# Patient Record
Sex: MALE | Race: WHITE | NOT HISPANIC OR LATINO | Employment: FULL TIME | URBAN - METROPOLITAN AREA
[De-identification: names, ages, dates, MRNs, and addresses within clinical notes are randomized per-mention and may not be internally consistent; named-entity substitution may affect disease eponyms.]

---

## 2024-06-30 ENCOUNTER — HOSPITAL ENCOUNTER (OUTPATIENT)
Facility: HOSPITAL | Age: 44
Setting detail: OBSERVATION
Discharge: HOME | End: 2024-07-01
Attending: EMERGENCY MEDICINE | Admitting: EMERGENCY MEDICINE
Payer: COMMERCIAL

## 2024-06-30 ENCOUNTER — APPOINTMENT (OUTPATIENT)
Dept: GENERAL RADIOLOGY | Facility: HOSPITAL | Age: 44
End: 2024-06-30
Payer: COMMERCIAL

## 2024-06-30 DIAGNOSIS — R07.2 SUBSTERNAL CHEST PAIN: ICD-10-CM

## 2024-06-30 DIAGNOSIS — T58.91XA TOXIC EFFECT OF CARBON MONOXIDE, UNINTENTIONAL, INITIAL ENCOUNTER: Primary | ICD-10-CM

## 2024-06-30 LAB
COHGB MFR BLD: 24.3 % (ref 0–3)
HOLD SPECIMEN: NORMAL
HOLD SPECIMEN: NORMAL
WHOLE BLOOD HOLD COAG: NORMAL
WHOLE BLOOD HOLD SPECIMEN: NORMAL

## 2024-06-30 PROCEDURE — 96374 THER/PROPH/DIAG INJ IV PUSH: CPT

## 2024-06-30 PROCEDURE — 82375 ASSAY CARBOXYHB QUANT: CPT

## 2024-06-30 PROCEDURE — 25810000003 SODIUM CHLORIDE 0.9 % SOLUTION

## 2024-06-30 PROCEDURE — 85025 COMPLETE CBC W/AUTO DIFF WBC: CPT

## 2024-06-30 PROCEDURE — 93005 ELECTROCARDIOGRAM TRACING: CPT | Performed by: EMERGENCY MEDICINE

## 2024-06-30 PROCEDURE — 99285 EMERGENCY DEPT VISIT HI MDM: CPT

## 2024-06-30 PROCEDURE — 71046 X-RAY EXAM CHEST 2 VIEWS: CPT

## 2024-06-30 PROCEDURE — 93005 ELECTROCARDIOGRAM TRACING: CPT

## 2024-06-30 PROCEDURE — 85007 BL SMEAR W/DIFF WBC COUNT: CPT

## 2024-06-30 RX ORDER — FAMOTIDINE 10 MG/ML
20 INJECTION, SOLUTION INTRAVENOUS ONCE
Status: COMPLETED | OUTPATIENT
Start: 2024-06-30 | End: 2024-06-30

## 2024-06-30 RX ORDER — SODIUM CHLORIDE 0.9 % (FLUSH) 0.9 %
10 SYRINGE (ML) INJECTION AS NEEDED
Status: DISCONTINUED | OUTPATIENT
Start: 2024-06-30 | End: 2024-07-01 | Stop reason: HOSPADM

## 2024-06-30 RX ADMIN — SODIUM CHLORIDE 500 ML: 0.9 INJECTION, SOLUTION INTRAVENOUS at 23:45

## 2024-06-30 RX ADMIN — FAMOTIDINE 20 MG: 10 INJECTION INTRAVENOUS at 23:45

## 2024-07-01 VITALS
HEIGHT: 74 IN | BODY MASS INDEX: 37.83 KG/M2 | SYSTOLIC BLOOD PRESSURE: 132 MMHG | TEMPERATURE: 97.3 F | HEART RATE: 52 BPM | WEIGHT: 294.75 LBS | DIASTOLIC BLOOD PRESSURE: 83 MMHG | OXYGEN SATURATION: 100 % | RESPIRATION RATE: 16 BRPM

## 2024-07-01 PROBLEM — T58.91XA CARBON MONOXIDE POISONING: Status: ACTIVE | Noted: 2024-07-01

## 2024-07-01 LAB
ALBUMIN SERPL-MCNC: 4.2 G/DL (ref 3.5–5.2)
ALBUMIN/GLOB SERPL: 2 G/DL
ALP SERPL-CCNC: 70 U/L (ref 39–117)
ALT SERPL W P-5'-P-CCNC: 24 U/L (ref 1–41)
ANION GAP SERPL CALCULATED.3IONS-SCNC: 8.7 MMOL/L (ref 5–15)
AST SERPL-CCNC: 24 U/L (ref 1–40)
BASOPHILS # BLD AUTO: 0.04 10*3/MM3 (ref 0–0.2)
BASOPHILS # BLD AUTO: 0.06 10*3/MM3 (ref 0–0.2)
BASOPHILS NFR BLD AUTO: 0.4 % (ref 0–1.5)
BASOPHILS NFR BLD AUTO: 0.5 % (ref 0–1.5)
BILIRUB SERPL-MCNC: 0.2 MG/DL (ref 0–1.2)
BUN SERPL-MCNC: 21 MG/DL (ref 6–20)
BUN/CREAT SERPL: 21.9 (ref 7–25)
CALCIUM SPEC-SCNC: 8.8 MG/DL (ref 8.6–10.5)
CHLORIDE SERPL-SCNC: 105 MMOL/L (ref 98–107)
CO2 SERPL-SCNC: 25.3 MMOL/L (ref 22–29)
COHGB MFR BLD: 4.6 % (ref 0–3)
CREAT SERPL-MCNC: 0.96 MG/DL (ref 0.76–1.27)
DEPRECATED RDW RBC AUTO: 41.8 FL (ref 37–54)
DEPRECATED RDW RBC AUTO: 42.4 FL (ref 37–54)
EGFRCR SERPLBLD CKD-EPI 2021: 100 ML/MIN/1.73
EOSINOPHIL # BLD AUTO: 0.26 10*3/MM3 (ref 0–0.4)
EOSINOPHIL # BLD AUTO: 0.3 10*3/MM3 (ref 0–0.4)
EOSINOPHIL NFR BLD AUTO: 2.7 % (ref 0.3–6.2)
EOSINOPHIL NFR BLD AUTO: 2.7 % (ref 0.3–6.2)
ERYTHROCYTE [DISTWIDTH] IN BLOOD BY AUTOMATED COUNT: 13 % (ref 12.3–15.4)
ERYTHROCYTE [DISTWIDTH] IN BLOOD BY AUTOMATED COUNT: 13 % (ref 12.3–15.4)
GLOBULIN UR ELPH-MCNC: 2.1 GM/DL
GLUCOSE SERPL-MCNC: 94 MG/DL (ref 65–99)
HCT VFR BLD AUTO: 38.8 % (ref 37.5–51)
HCT VFR BLD AUTO: 44.9 % (ref 37.5–51)
HGB BLD-MCNC: 12.9 G/DL (ref 13–17.7)
HGB BLD-MCNC: 15.2 G/DL (ref 13–17.7)
IMM GRANULOCYTES # BLD AUTO: 0.04 10*3/MM3 (ref 0–0.05)
IMM GRANULOCYTES # BLD AUTO: 0.04 10*3/MM3 (ref 0–0.05)
IMM GRANULOCYTES NFR BLD AUTO: 0.4 % (ref 0–0.5)
IMM GRANULOCYTES NFR BLD AUTO: 0.4 % (ref 0–0.5)
LARGE PLATELETS: NORMAL
LYMPHOCYTES # BLD AUTO: 2.19 10*3/MM3 (ref 0.7–3.1)
LYMPHOCYTES # BLD AUTO: 2.42 10*3/MM3 (ref 0.7–3.1)
LYMPHOCYTES NFR BLD AUTO: 21.4 % (ref 19.6–45.3)
LYMPHOCYTES NFR BLD AUTO: 22.8 % (ref 19.6–45.3)
MCH RBC QN AUTO: 29.3 PG (ref 26.6–33)
MCH RBC QN AUTO: 29.8 PG (ref 26.6–33)
MCHC RBC AUTO-ENTMCNC: 33.2 G/DL (ref 31.5–35.7)
MCHC RBC AUTO-ENTMCNC: 33.9 G/DL (ref 31.5–35.7)
MCV RBC AUTO: 88 FL (ref 79–97)
MCV RBC AUTO: 88.2 FL (ref 79–97)
MONOCYTES # BLD AUTO: 0.42 10*3/MM3 (ref 0.1–0.9)
MONOCYTES # BLD AUTO: 0.7 10*3/MM3 (ref 0.1–0.9)
MONOCYTES NFR BLD AUTO: 4.4 % (ref 5–12)
MONOCYTES NFR BLD AUTO: 6.2 % (ref 5–12)
NEUTROPHILS NFR BLD AUTO: 6.67 10*3/MM3 (ref 1.7–7)
NEUTROPHILS NFR BLD AUTO: 68.8 % (ref 42.7–76)
NEUTROPHILS NFR BLD AUTO: 69.3 % (ref 42.7–76)
NEUTROPHILS NFR BLD AUTO: 7.8 10*3/MM3 (ref 1.7–7)
NRBC BLD AUTO-RTO: 0 /100 WBC (ref 0–0.2)
NRBC BLD AUTO-RTO: 0 /100 WBC (ref 0–0.2)
PLATELET # BLD AUTO: 240 10*3/MM3 (ref 140–450)
PLATELET # BLD AUTO: 274 10*3/MM3 (ref 140–450)
PMV BLD AUTO: 11.3 FL (ref 6–12)
PMV BLD AUTO: 11.7 FL (ref 6–12)
POTASSIUM SERPL-SCNC: 3.9 MMOL/L (ref 3.5–5.2)
PROT SERPL-MCNC: 6.3 G/DL (ref 6–8.5)
QT INTERVAL: 333 MS
QTC INTERVAL: 451 MS
RBC # BLD AUTO: 4.4 10*6/MM3 (ref 4.14–5.8)
RBC # BLD AUTO: 5.1 10*6/MM3 (ref 4.14–5.8)
RBC MORPH BLD: NORMAL
SMALL PLATELETS BLD QL SMEAR: ADEQUATE
SODIUM SERPL-SCNC: 139 MMOL/L (ref 136–145)
TROPONIN T SERPL HS-MCNC: 12 NG/L
TROPONIN T SERPL HS-MCNC: 17 NG/L
WBC MORPH BLD: NORMAL
WBC NRBC COR # BLD AUTO: 11.32 10*3/MM3 (ref 3.4–10.8)
WBC NRBC COR # BLD AUTO: 9.62 10*3/MM3 (ref 3.4–10.8)

## 2024-07-01 PROCEDURE — G0378 HOSPITAL OBSERVATION PER HR: HCPCS

## 2024-07-01 PROCEDURE — 80053 COMPREHEN METABOLIC PANEL: CPT

## 2024-07-01 PROCEDURE — 85025 COMPLETE CBC W/AUTO DIFF WBC: CPT

## 2024-07-01 PROCEDURE — 84484 ASSAY OF TROPONIN QUANT: CPT

## 2024-07-01 PROCEDURE — 82375 ASSAY CARBOXYHB QUANT: CPT

## 2024-07-01 PROCEDURE — 84484 ASSAY OF TROPONIN QUANT: CPT | Performed by: EMERGENCY MEDICINE

## 2024-07-01 RX ORDER — ONDANSETRON 2 MG/ML
4 INJECTION INTRAMUSCULAR; INTRAVENOUS EVERY 6 HOURS PRN
Status: DISCONTINUED | OUTPATIENT
Start: 2024-07-01 | End: 2024-07-01 | Stop reason: HOSPADM

## 2024-07-01 RX ORDER — ALBUTEROL SULFATE 90 UG/1
2 AEROSOL, METERED RESPIRATORY (INHALATION) EVERY 4 HOURS PRN
Qty: 8 G | Refills: 0 | Status: SHIPPED | OUTPATIENT
Start: 2024-07-01

## 2024-07-01 RX ORDER — SODIUM CHLORIDE 0.9 % (FLUSH) 0.9 %
10 SYRINGE (ML) INJECTION AS NEEDED
Status: DISCONTINUED | OUTPATIENT
Start: 2024-07-01 | End: 2024-07-01 | Stop reason: HOSPADM

## 2024-07-01 RX ORDER — SODIUM CHLORIDE 0.9 % (FLUSH) 0.9 %
10 SYRINGE (ML) INJECTION EVERY 12 HOURS SCHEDULED
Status: DISCONTINUED | OUTPATIENT
Start: 2024-07-01 | End: 2024-07-01 | Stop reason: HOSPADM

## 2024-07-01 RX ORDER — SODIUM CHLORIDE 9 MG/ML
40 INJECTION, SOLUTION INTRAVENOUS AS NEEDED
Status: DISCONTINUED | OUTPATIENT
Start: 2024-07-01 | End: 2024-07-01 | Stop reason: HOSPADM

## 2024-07-01 RX ORDER — UREA 10 %
5 LOTION (ML) TOPICAL NIGHTLY PRN
Status: DISCONTINUED | OUTPATIENT
Start: 2024-07-01 | End: 2024-07-01 | Stop reason: HOSPADM

## 2024-07-01 RX ADMIN — Medication 10 ML: at 08:27

## 2024-07-01 RX ADMIN — Medication 10 ML: at 01:42

## 2024-07-01 NOTE — ED PROVIDER NOTES
Subjective   History of Present Illness  Chief Complaint: Dizziness, elevated blood pressure      HPI: Patient is a 44-year-old male who presents by private vehicle he states this evening he was working in the garage with the doors closed, car running shortly following this he became dizzy flushed with mid chest pain radiating into his back.  He describes it as a burning.  He states he is post take blood pressure medicine but has been monitoring his blood pressure with lifestyle changes and he typically runs in the 120s. Continues mild midsternal burning, other symptoms have resolved.  No thoughts of harming himself. Took 6-7 baby aspirins just before arrival.     PCP: none on file     History provided by:  Patient      Review of Systems  See above as noted     No past medical history on file.    No Known Allergies    No past surgical history on file.    No family history on file.    Social History     Socioeconomic History    Marital status:            Objective   Physical Exam  Vitals reviewed.   Constitutional:       Appearance: He is obese. He is not toxic-appearing.   HENT:      Head: Normocephalic.   Eyes:      Pupils: Pupils are equal, round, and reactive to light.   Cardiovascular:      Rate and Rhythm: Normal rate.      Pulses: Normal pulses.      Heart sounds: Normal heart sounds.   Pulmonary:      Effort: Pulmonary effort is normal.      Breath sounds: No stridor. No wheezing.   Skin:     Coloration: Skin is not pale.   Neurological:      General: No focal deficit present.      Mental Status: He is alert and oriented to person, place, and time. Mental status is at baseline.      Motor: No weakness.         Procedures                 ED Course  ED Course as of 07/01/24 0126   Sun Jun 30, 2024   2343 Carbon Monoxide, Blood(!!): 24.3 [BH]      ED Course User Index  [BH] Roxy Lantigua APRN      /85 (BP Location: Right arm, Patient Position: Lying)   Pulse 73   Temp 97.3 °F (36.3 °C)  "(Oral)   Resp 16   Ht 188 cm (74\")   Wt 134 kg (294 lb 12.1 oz)   SpO2 100%   BMI 37.84 kg/m²   Labs Reviewed   COMPREHENSIVE METABOLIC PANEL - Abnormal; Notable for the following components:       Result Value    BUN 21 (*)     All other components within normal limits    Narrative:     GFR Normal >60  Chronic Kidney Disease <60  Kidney Failure <15     CBC WITH AUTO DIFFERENTIAL - Abnormal; Notable for the following components:    WBC 11.32 (*)     Neutrophils, Absolute 7.80 (*)     All other components within normal limits   CARBON MONOXIDE, BLOOD - Abnormal; Notable for the following components:    Carbon Monoxide, Blood 24.3 (*)     All other components within normal limits   TROPONIN - Normal    Narrative:     High Sensitive Troponin T Reference Range:  <14.0 ng/L- Negative Female for AMI  <22.0 ng/L- Negative Male for AMI  >=14 - Abnormal Female indicating possible myocardial injury.  >=22 - Abnormal Male indicating possible myocardial injury.   Clinicians would have to utilize clinical acumen, EKG, Troponin, and serial changes to determine if it is an Acute Myocardial Infarction or myocardial injury due to an underlying chronic condition.        RAINBOW DRAW    Narrative:     The following orders were created for panel order [place] Draw.  Procedure                               Abnormality         Status                     ---------                               -----------         ------                     Green Top (Gel)[[id number]]                                  Final result               Lavender Top[[id number]]                                     Final result               Gold Top - SST[[id number]]                                   Final result               Light Blue Top[[id number]]                                   Final result                 Please view results for these tests on the individual orders.   SCAN SLIDE   CARBON MONOXIDE, BLOOD   HIGH SENSITIVITIY TROPONIN T 2HR   BASIC METABOLIC " PANEL   CBC WITH AUTO DIFFERENTIAL   GREEN TOP   LAVENDER TOP   GOLD TOP - SST   LIGHT BLUE TOP   CBC AND DIFFERENTIAL    Narrative:     The following orders were created for panel order CBC & Differential.  Procedure                               Abnormality         Status                     ---------                               -----------         ------                     CBC Auto Differential[[id number]]        Abnormal            Final result               Scan Slide[[id number]]                                       Final result                 Please view results for these tests on the individual orders.     Medications   sodium chloride 0.9 % flush 10 mL (has no administration in time range)   sodium chloride 0.9 % flush 10 mL (has no administration in time range)   sodium chloride 0.9 % flush 10 mL (has no administration in time range)   sodium chloride 0.9 % infusion 40 mL (has no administration in time range)   ondansetron (ZOFRAN) injection 4 mg (has no administration in time range)   melatonin tablet 5 mg (has no administration in time range)   sodium chloride 0.9 % bolus 500 mL (500 mL Intravenous New Bag 6/30/24 2345)   famotidine (PEPCID) injection 20 mg (20 mg Intravenous Given 6/30/24 2345)     XR Chest 2 View    Result Date: 7/1/2024  Impression: No active disease. Electronically Signed: Ermias Weinstein MD  7/1/2024 12:11 AM EDT  Workstation ID: [id number]                                          Medical Decision Making  Patient presented with above complaints, noted physical exam was completed he was placed on a cardiac monitor and IV was established initial EKG noted sinus tachycardia.  Significant hypertension with systolic over 200.  Labs ordered patient's carbon oxide was at 24.3 he was placed on a nonrebreather mask.  This is likely the cause of the patients reported symptoms. ACS felt to be unlikely, Troponin is 17 chest x-ray negative for cardiomegaly no evidence of pneumonia white blood cell  count slightly elevated 11.3 no evidence of dehydration renal insufficiency or electrolyte imbalance.  Blood pressure notably improved and did not require treatment at time of transport to unit at 138/85.  Patient will be placed in ED observation for continued monitoring with repeat carbon monoxide level after he has returned to baseline and is asymptomatic he can be discharged home.  Bedside have discussed the ED findings with the patient as well as wife at bedside who is given verbal understanding of the plan of care.  Patient in no acute distress at time of placement.    Chart review: 8/7/2021 outpatient visit with local urgent care facility related to COVID-19 exposure      Note Disclaimer: At [facility], we believe that sharing information builds trust and better  relationships. You are receiving this note because you recently visited [facility]. It is possible you will see health information before a provider has talked with you about it. This kind of information can be easy to misunderstand. To help you fully understand what it means for your health, we urge you to discuss this note with your provider.       Part of this note may be an electronic transcription/translation of spoken language to printed text using the Dragon Dictation System.    Appropriate PPE worn during exam.    Problems Addressed:  Toxic effect of carbon monoxide, unintentional, initial encounter: complicated acute illness or injury    Amount and/or Complexity of Data Reviewed  Labs: ordered. Decision-making details documented in ED Course.  Radiology: ordered and independent interpretation performed. Decision-making details documented in ED Course.  ECG/medicine tests: ordered and independent interpretation performed. Decision-making details documented in ED Course.    Risk  OTC drugs.  Prescription drug management.  Decision regarding hospitalization.        Final diagnoses:   Toxic effect of carbon monoxide, unintentional,  initial encounter   Substernal chest pain       ED Disposition  ED Disposition       ED Disposition   Decision to Admit    Condition   --    Comment   --               No follow-up provider specified.       Medication List      No changes were made to your prescriptions during this visit.            Roxy Lantigua, APRN  07/01/24 0126

## 2024-07-01 NOTE — DISCHARGE SUMMARY
[facility]    Provider, No Known    CHIEF COMPLAINT:     Dizziness     HISTORY OF PRESENT ILLNESS:    HPI    Patient is a 44-year-old male who presents by private vehicle he states this evening he was working in the garage with the doors closed, car running shortly following this he became dizzy flushed with mid chest pain radiating into his back. He describes it as a burning. He states he is post take blood pressure medicine but has been monitoring his blood pressure with lifestyle changes and he typically runs in the 120s. Continues mild midsternal burning, other symptoms have resolved. No thoughts of harming himself. Took 6-7 baby aspirins just before arrival.     History reviewed. No pertinent past medical history.  History reviewed. No pertinent surgical history.  History reviewed. No pertinent family history.  Social History     Tobacco Use    Smoking status: Never    Smokeless tobacco: Current     Types: Chew   Vaping Use    Vaping status: Never Used   Substance Use Topics    Alcohol use: Never    Drug use: Never     No medications prior to admission.     Allergies:  Patient has no known allergies.      There is no immunization history on file for this patient.        REVIEW OF SYSTEMS:    ROS    Vital Signs  Temp:  [97.3 °F (36.3 °C)-97.7 °F (36.5 °C)] 97.3 °F (36.3 °C)  Heart Rate:  [] 52  Resp:  [16-17] 16  BP: (132-202)/() 132/83          Physical Exam:  Physical Exam    Emotional Behavior:    WNl   Debilities:   none  Results Review:    I reviewed the patient's new clinical results.  Lab Results (most recent)       Procedure Component Value Units Date/Time    High Sensitivity Troponin T [[id number]]  (Normal) Collected: 07/01/24 0341    Specimen: Blood Updated: 07/01/24 0419     HS Troponin T 12 ng/L     Narrative:      High Sensitive Troponin T Reference Range:  <14.0 ng/L- Negative Female for AMI  <22.0 ng/L- Negative Male for AMI  >=14 - Abnormal Female indicating  possible myocardial injury.  >=22 - Abnormal Male indicating possible myocardial injury.   Clinicians would have to utilize clinical acumen, EKG, Troponin, and serial changes to determine if it is an Acute Myocardial Infarction or myocardial injury due to an underlying chronic condition.         CBC Auto Differential [[id number]]  (Abnormal) Collected: 07/01/24 0341    Specimen: Blood Updated: 07/01/24 0411     WBC 9.62 10*3/mm3      RBC 4.40 10*6/mm3      Hemoglobin 12.9 g/dL      Comment: Result checked          Hematocrit 38.8 %      MCV 88.2 fL      MCH 29.3 pg      MCHC 33.2 g/dL      RDW 13.0 %      RDW-SD 42.4 fl      MPV 11.3 fL      Platelets 240 10*3/mm3      Neutrophil % 69.3 %      Lymphocyte % 22.8 %      Monocyte % 4.4 %      Eosinophil % 2.7 %      Basophil % 0.4 %      Immature Grans % 0.4 %      Neutrophils, Absolute 6.67 10*3/mm3      Lymphocytes, Absolute 2.19 10*3/mm3      Monocytes, Absolute 0.42 10*3/mm3      Eosinophils, Absolute 0.26 10*3/mm3      Basophils, Absolute 0.04 10*3/mm3      Immature Grans, Absolute 0.04 10*3/mm3      nRBC 0.0 /100 WBC     Carbon Monoxide, Blood [[id number]]  (Abnormal) Collected: 07/01/24 0341    Specimen: Blood Updated: 07/01/24 0351     Carbon Monoxide, Blood 4.6 %     Comprehensive Metabolic Panel [[id number]]  (Abnormal) Collected: 07/01/24 0047    Specimen: Blood from Arm, Right Updated: 07/01/24 0114     Glucose 94 mg/dL      BUN 21 mg/dL      Creatinine 0.96 mg/dL      Sodium 139 mmol/L      Potassium 3.9 mmol/L      Comment: Slight hemolysis detected by analyzer. Result may be falsely elevated.        Chloride 105 mmol/L      CO2 25.3 mmol/L      Calcium 8.8 mg/dL      Total Protein 6.3 g/dL      Albumin 4.2 g/dL      ALT (SGPT) 24 U/L      AST (SGOT) 24 U/L      Alkaline Phosphatase 70 U/L      Total Bilirubin 0.2 mg/dL      Globulin 2.1 gm/dL      A/G Ratio 2.0 g/dL      BUN/Creatinine Ratio 21.9     Anion Gap 8.7 mmol/L      eGFR 100.0 mL/min/1.73      Narrative:      GFR Normal >60  Chronic Kidney Disease <60  Kidney Failure <15      High Sensitivity Troponin T [[id number]]  (Normal) Collected: 07/01/24 0047    Specimen: Blood from Arm, Right Updated: 07/01/24 0111     HS Troponin T 17 ng/L     Narrative:      High Sensitive Troponin T Reference Range:  <14.0 ng/L- Negative Female for AMI  <22.0 ng/L- Negative Male for AMI  >=14 - Abnormal Female indicating possible myocardial injury.  >=22 - Abnormal Male indicating possible myocardial injury.   Clinicians would have to utilize clinical acumen, EKG, Troponin, and serial changes to determine if it is an Acute Myocardial Infarction or myocardial injury due to an underlying chronic condition.         CBC & Differential [[id number]]  (Abnormal) Collected: 06/30/24 2320    Specimen: Blood from Arm, Right Updated: 07/01/24 0014    Narrative:      The following orders were created for panel order CBC & Differential.  Procedure                               Abnormality         Status                     ---------                               -----------         ------                     CBC Auto Differential[[id number]]        Abnormal            Final result               Scan Slide[[id number]]                                       Final result                 Please view results for these tests on the individual orders.    CBC Auto Differential [[id number]]  (Abnormal) Collected: 06/30/24 2320    Specimen: Blood from Arm, Right Updated: 07/01/24 0014     WBC 11.32 10*3/mm3      RBC 5.10 10*6/mm3      Hemoglobin 15.2 g/dL      Hematocrit 44.9 %      MCV 88.0 fL      MCH 29.8 pg      MCHC 33.9 g/dL      RDW 13.0 %      RDW-SD 41.8 fl      MPV 11.7 fL      Platelets 274 10*3/mm3      Neutrophil % 68.8 %      Lymphocyte % 21.4 %      Monocyte % 6.2 %      Eosinophil % 2.7 %      Basophil % 0.5 %      Immature Grans % 0.4 %      Neutrophils, Absolute 7.80 10*3/mm3      Lymphocytes, Absolute 2.42 10*3/mm3      Monocytes,  Absolute 0.70 10*3/mm3      Eosinophils, Absolute 0.30 10*3/mm3      Basophils, Absolute 0.06 10*3/mm3      Immature Grans, Absolute 0.04 10*3/mm3      nRBC 0.0 /100 WBC     Scan Slide [[id number]] Collected: 06/30/24 2320    Specimen: Blood from Arm, Right Updated: 07/01/24 0014     RBC Morphology Normal     WBC Morphology Normal     Platelet Estimate Adequate     Large Platelets Mod/2+    Carbon Monoxide, Blood [[id number]]  (Abnormal) Collected: 06/30/24 2320    Specimen: Blood from Arm, Right Updated: 06/30/24 2333     Carbon Monoxide, Blood 24.3 %     [place] Draw [[id number]] Collected: 06/30/24 2320    Specimen: Blood from Arm, Right Updated: 06/30/24 2330    Narrative:      The following orders were created for panel order [place] Draw.  Procedure                               Abnormality         Status                     ---------                               -----------         ------                     Green Top (Gel)[[id number]]                                  Final result               Lavender Top[[id number]]                                     Final result               Gold Top - SST[[id number]]                                   Final result               Light Blue Top[[id number]]                                   Final result                 Please view results for these tests on the individual orders.    Green Top (Gel) [[id number]] Collected: 06/30/24 2320    Specimen: Blood from Arm, Right Updated: 06/30/24 2330     Extra Tube Hold for add-ons.     Comment: Auto resulted.       Lavender Top [[id number]] Collected: 06/30/24 2320    Specimen: Blood from Arm, Right Updated: 06/30/24 2330     Extra Tube hold for add-on     Comment: Auto resulted       Gold Top - SST [[id number]] Collected: 06/30/24 2320    Specimen: Blood from Arm, Right Updated: 06/30/24 2330     Extra Tube Hold for add-ons.     Comment: Auto resulted.       Light Blue Top [[id number]] Collected: 06/30/24 2320    Specimen: Blood from Arm,  Right Updated: 06/30/24 2330     Extra Tube Hold for add-ons.     Comment: Auto resulted               Imaging Results (Most Recent)       Procedure Component Value Units Date/Time    XR Chest 2 View [[id number]] Collected: 07/01/24 0010     Updated: 07/01/24 0013    Narrative:      XR CHEST 2 VW    Date of Exam: 6/30/2024 11:35 PM EDT    Indication: cp    Comparison: Chest radiograph 6/25/2016    Findings:  There are no airspace consolidations. No pleural fluid. No pneumothorax. The pulmonary vasculature appears within normal limits. The cardiac and mediastinal silhouette appear unremarkable. No acute osseous abnormality identified.      Impression:      Impression:  No active disease.        Electronically Signed: Ermias Weinstein MD    7/1/2024 12:11 AM EDT    Workstation ID: [id number]          reviewed    ECG/EMG Results (most recent)       Procedure Component Value Units Date/Time    Telemetry Scan [[id number]] Resulted: 06/30/24     Updated: 07/01/24 0128    ECG 12 Lead Chest Pain [[id number]] Collected: 06/30/24 2248     Updated: 07/01/24 0728     QT Interval 333 ms      QTC Interval 451 ms     Narrative:      HEART QOHV=079  bpm  RR Thvxfsxg=934  ms  NE Hfxzeqzn=619  ms  P Horizontal Axis=23  deg  P Front Axis=57  deg  QRSD Interval=84  ms  QT Xpmmqgzw=366  ms  GEwL=274  ms  QRS Axis=37  deg  T Wave Axis=-28  deg  - OTHERWISE NORMAL ECG -  Sinus tachycardia  Electronically Signed By: August Botello (JOSE M) 2024-07-01 07:28:21  Date and Time of Study:2024-06-30 22:48:23          reviewed            Microbiology Results (last 10 days)       ** No results found for the last 240 hours. **            Assessment & Plan     Carbon monoxide poisoning       Carbon Monoxide Poisoning  -Carbon oxide 24.3 and reduced to 4.6 this morning  -Troponin unremarkable  -CBC and CMP fairly unremarkable  -Chest x-ray shows no active cardiopulmonary disease  -15 L nonrebreather overnight  -Continuous pulse ox with O2 99% on room air  -EKG  showed sinus tachycardia with heart 111 on upon admission  -Telemetry with sinus rhythm  -Given Pepcid, fluids, and Zofran in ED       I discussed the patients findings and my recommendations with patient and family.     Discharge Diagnosis:      Carbon monoxide poisoning      Hospital Course  Patient is a 44 y.o. male presented with dizziness and chest pain.  Patient states he was working out in his garage with close doors on a car.  Patient states to be given dizzy flushed with midsternal chest pain.  States he did take baby aspirin for arrival without change and chest pain.  Noted hypertension with systolic over 200s and reduced during stay.  Patient carbon oxide of 24.3 and placed on nonrebreather at 15 L.  Patient had troponin of 17 and 12 which were unremarkable.  Chest x-ray showed no evidence of cardiomegaly or pneumonia.  EKG shows sinus tachycardia upon admission but placed on telemetry unit in sinus rhythm without ST wave elevation.  Patient given Pepcid fluids and Zofran in ED with improved symptoms.  CBC and CMP fairly unremarkable.  Patient taken off nonrebreather and able to talk and communicate without complications.  Continuous pulse ox showed O2 at 99% on room air.  Patient take albuterol inhaler as needed at home.  Patient given educational with wife at bedside.  Testing recommendations reviewed patient and they agree with treatment plan.  If symptoms worsen patient to call 911 or go to nearest ED.    Past Medical History:   History reviewed. No pertinent past medical history.    Past Surgical History:   History reviewed. No pertinent surgical history.    Social History:   Social History     Socioeconomic History    Marital status:    Tobacco Use    Smoking status: Never    Smokeless tobacco: Current     Types: Chew   Vaping Use    Vaping status: Never Used   Substance and Sexual Activity    Alcohol use: Never    Drug use: Never    Sexual activity: Defer       Procedures Performed          Consults:   Consults       No orders found for last 30 day(s).            Condition on Discharge:     Stable    Discharge Disposition  Home or Self Care    Discharge Medications     Discharge Medications        New Medications        Instructions Start Date   albuterol sulfate  (90 Base) MCG/ACT inhaler  Commonly known as: PROVENTIL HFA;VENTOLIN HFA;PROAIR HFA   2 puffs, Inhalation, Every 4 Hours PRN               Discharge Diet:   Diet Instructions       Diet: Cardiac Diets; Healthy Heart (2-3 Na+); Regular (IDDSI 7); Thin (IDDSI 0)      Discharge Diet: Cardiac Diets    Cardiac Diet: Healthy Heart (2-3 Na+)    Texture: Regular (IDDSI 7)    Fluid Consistency: Thin (IDDSI 0)            Activity at Discharge:   Activity Instructions       Activity as Tolerated      Measure Blood Pressure              Follow-up Appointments  No future appointments.  Additional Instructions for the Follow-ups that You Need to Schedule       Discharge Follow-up with PCP   As directed       Currently Documented PCP:    Provider, No Known    PCP Phone Number:    None     Follow Up Details: 7-10 days                Test Results Pending at Discharge       Risk for Readmission (LACE) Score: 1 (7/1/2024  6:00 AM)          ADITYA Aviles  07/01/24  08:50 EDT        I spent 35 minutes caring for Alexys on this date of service. This time includes time spent by me in the following activities: reviewing tests, performing a medically appropriate examination and/or evaluation, counseling and educating the patient/family/caregiver, referring and communicating with other health care professionals, documenting information in the medical record, independently interpreting results and communicating that information with the patient/family/caregiver, care coordination, ordering medications, ordering test(s), ordering procedure(s), obtaining a separately obtained history, and reviewing a separately obtained history.

## 2024-07-01 NOTE — CASE MANAGEMENT/SOCIAL WORK
Case Management Discharge Note    Final Note: Routine home.     Selected Continued Care - Admitted Since 6/30/2024      Transportation Services  Private: Car  Final Discharge Disposition Code: 01 - home or self-care      Continued Stay Note  RISHI Puneet     Patient Name: Alexys Lazcano  MRN: [id number]  Today's Date: 7/1/2024    Admit Date: 6/30/2024    Plan: Routine home.   Discharge Plan       Row Name 07/01/24 0913       Plan    Plan Routine home.    Patient/Family in Agreement with Plan yes    Plan Comments CM met with patient at bedside, discharge orders placed, denies needs concerns at this time.    Final Discharge Disposition Code 01 - home or self-care    Final Note Routine home.               Expected Discharge Date and Time       Expected Discharge Date Expected Discharge Time    Jul 1, 2024        Karen Sutherland RN     Office Phone [phone]

## 2024-07-01 NOTE — PLAN OF CARE
Problem: Adult Inpatient Plan of Care  Goal: Plan of Care Review  7/1/2024 0139 by Neli Rowell RN  Outcome: Ongoing, Progressing  Flowsheets (Taken 7/1/2024 0139)  Progress: improving  Plan of Care Reviewed With: patient  Outcome Evaluation: pt new admit for carbon monoxide poisoning from working on his car while in an enclosed space. pt is on 15L nonrebreather to lower levels. pt is not having any complaints at this time. no further orders. care continues.  7/1/2024 0139 by Neli Rowell RN  Outcome: Ongoing, Not Progressing  Flowsheets (Taken 7/1/2024 0139)  Progress: improving  Plan of Care Reviewed With: patient  Outcome Evaluation: pt new admit for carbon monoxide poisoning from working on his car while in an enclosed space. pt is on 15L nonrebreather to lower levels. pt is not having any complaints at this time. no further orders. care continues.  Goal: Patient-Specific Goal (Individualized)  7/1/2024 0139 by Neli Rowell RN  Outcome: Ongoing, Progressing  7/1/2024 0139 by Neli Rowell RN  Outcome: Ongoing, Not Progressing  Goal: Absence of Hospital-Acquired Illness or Injury  7/1/2024 0139 by Neli Rowell RN  Outcome: Ongoing, Progressing  7/1/2024 0139 by Neli Rowell RN  Outcome: Ongoing, Not Progressing  Goal: Optimal Comfort and Wellbeing  7/1/2024 0139 by Neli Rowell RN  Outcome: Ongoing, Progressing  7/1/2024 0139 by Neli Rowell RN  Outcome: Ongoing, Not Progressing  Goal: Readiness for Transition of Care  7/1/2024 0139 by Neli Rowell RN  Outcome: Ongoing, Progressing  7/1/2024 0139 by Neli Rowell RN  Outcome: Ongoing, Not Progressing  Intervention: Mutually Develop Transition Plan  Recent Flowsheet Documentation  Taken 7/1/2024 0137 by Neli Rowell RN  Transportation Anticipated: family or friend will provide  Patient/Family Anticipated Services at Transition: none  Patient/Family Anticipates Transition to: home with family  Taken 7/1/2024  0132 by Neli Rowell, RN  Equipment Currently Used at Home: none     Problem: Nausea and Vomiting  Goal: Fluid and Electrolyte Balance  Outcome: Ongoing, Progressing   Goal Outcome Evaluation:  Plan of Care Reviewed With: patient        Progress: improving  Outcome Evaluation: pt new admit for carbon monoxide poisoning from working on his car while in an enclosed space. pt is on 15L nonrebreather to lower levels. pt is not having any complaints at this time. no further orders. care continues.